# Patient Record
Sex: FEMALE | Employment: FULL TIME | URBAN - METROPOLITAN AREA
[De-identification: names, ages, dates, MRNs, and addresses within clinical notes are randomized per-mention and may not be internally consistent; named-entity substitution may affect disease eponyms.]

---

## 2020-10-06 ENCOUNTER — PRE VISIT (OUTPATIENT)
Dept: NEUROLOGY | Facility: CLINIC | Age: 60
End: 2020-10-06
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
## 2020-10-14 ENCOUNTER — VIRTUAL VISIT (OUTPATIENT)
Dept: NEUROLOGY | Facility: CLINIC | Age: 60
End: 2020-10-14
Payer: COMMERCIAL

## 2020-10-14 DIAGNOSIS — R42 MAL DE DEBARQUEMENT: ICD-10-CM

## 2020-10-14 DIAGNOSIS — H81.10 BENIGN PAROXYSMAL POSITIONAL VERTIGO, UNSPECIFIED LATERALITY: Primary | ICD-10-CM

## 2020-10-14 PROCEDURE — 99205 OFFICE O/P NEW HI 60 MIN: CPT | Mod: 95 | Performed by: PSYCHIATRY & NEUROLOGY
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
## 2020-10-15 PROBLEM — R42 MAL DE DEBARQUEMENT: Status: ACTIVE | Noted: 2020-10-15

## 2021-01-04 ENCOUNTER — HEALTH MAINTENANCE LETTER (OUTPATIENT)
Age: 61
End: 2021-01-04

## 2021-01-17 ENCOUNTER — DOCUMENTATION ONLY (OUTPATIENT)
Dept: CARE COORDINATION | Facility: CLINIC | Age: 61
End: 2021-01-17

## 2021-02-10 ENCOUNTER — VIRTUAL VISIT (OUTPATIENT)
Dept: NEUROLOGY | Facility: CLINIC | Age: 61
End: 2021-02-10
Payer: COMMERCIAL

## 2021-02-10 DIAGNOSIS — R42 VERTIGO: ICD-10-CM

## 2021-02-10 DIAGNOSIS — I87.1 COMPRESSION OF VEIN: ICD-10-CM

## 2021-02-10 DIAGNOSIS — R42 MAL DE DEBARQUEMENT: Primary | ICD-10-CM

## 2021-02-10 DIAGNOSIS — G54.0 TOS (THORACIC OUTLET SYNDROME): ICD-10-CM

## 2021-02-10 PROCEDURE — 99215 OFFICE O/P EST HI 40 MIN: CPT | Mod: 95 | Performed by: PSYCHIATRY & NEUROLOGY

## 2021-02-10 PROCEDURE — 99417 PROLNG OP E/M EACH 15 MIN: CPT | Performed by: PSYCHIATRY & NEUROLOGY

## 2021-02-10 RX ORDER — ALBUTEROL SULFATE 90 UG/1
AEROSOL, METERED RESPIRATORY (INHALATION)
COMMUNITY

## 2021-02-10 RX ORDER — ESCITALOPRAM OXALATE 10 MG/1
TABLET ORAL
COMMUNITY
Start: 2020-09-10

## 2021-02-10 RX ORDER — UBROGEPANT 100 MG/1
TABLET ORAL
COMMUNITY
Start: 2021-01-15

## 2021-02-10 RX ORDER — TOPIRAMATE 25 MG/1
3 CAPSULE, EXTENDED RELEASE ORAL EVERY 24 HOURS
COMMUNITY
Start: 2020-04-12

## 2021-02-10 RX ORDER — DICLOFENAC POTASSIUM 50 MG/1
POWDER, FOR SOLUTION ORAL
COMMUNITY

## 2021-02-10 RX ORDER — ESZOPICLONE 3 MG/1
TABLET, FILM COATED ORAL
COMMUNITY
Start: 2020-08-12

## 2021-02-10 NOTE — PROGRESS NOTES
The patient is being evaluated via a billable video visit.    How would you like to obtain your AVS? MyChart  If the video visit is dropped, the invitation should be resent by: Send to e-mail at: [email or web address]  Will anyone else be joining your video visit? No      Video-Visit Details  Type of service:  Video Visit  Video Start Time 6:10PM  Video End Time:7:02PM  Originating Location (pt. Location): Home  Distant Location (provider location):  [facility]   Platform used for Video Visit: [facility]    Follow-up 10-14-20   IMPRESSIONS:  Lily Mitchell is a 59 year old woman with a history of motion sickness, migraine with aura, with persistent mal de debarquement syndrome (MdDS) for 16 months that developed after a cruise (10-).     She was fairly stable until she caught COVID in mid-November. She had developed fever but no respiratory problems. She did not lose her sense of taste or smell.  She had light sensitivity, difficulty reading, and simply seeing. In that context she developed 15 days of non-stop migraine headaches. Her baseline headaches were about twice a year. The MdDS rocking were much much worse during that time. She could not get out of bed with the combination of symptoms. She was nauseated and was vomiting.  She was never hospitalized but she was left with chronic fatigue and an enhanced startle response.     She had gone to [place] around  and New Years to help her daughter who was having a baby.  She was feeling better by then. Then, she went into the hospital with her daughter during her labor and .  She had stopped taking her Lexapro and Lunesta for about 5 days in the ensuing hecticness of the peripartum period.  She was also sleeping very uncomfortably in the hospital on a cot.     In that context, she developed an episode of vertigo in which the world was moving and she was nauseated and vomited. She had tried the Epley maneuver  to no effect. The vertigo episode lasted all day and night long and most of the next morning. This was around January 14th.  She doesn't remember having a headache at the time but is unsure because the vertigo was so severe. She has since restarted the Lexapro and is back on 10mg.     She came back to [place] on January 17th and took a diazepam for the flight home. She had previously increased her topiramate from 25mg to 100mg a day over a 4 month period because she had such bad headaches but she is down to 75mg at night now because there was concern about glaucoma given some vision changes after COVID. She had also developed very vivid dreaming and an enhanced startle response which then increased the rocking. She has returned to taking 3mg of Lunesta but she can sometimes wake up after only 4 hours of sleep.     The rocking symptoms are currently worse at night or when she is laying down in the middle of the day. There is usually head pressure over the forehead and back of the neck. She usually wakes up with a headache in the morning. There is pressure over the eyes and the ears, especially in the left ear. There is sometimes some popping noises in the left ear. There is neck pain in the back base of the neck. She is not having any chest.     She has a lot of left>right numbness and tingling down her arms. The paresthesias are better when she moves around. She usually notices the symptoms when she has her hands up to read. She has noticed this more in the last few months.    She still feels better with the rocking when she is driving.     She is having 3-4 migraines a month now. There are visual symptoms of a rainbow or halo around objects.  She develops severe light sensitivity with the headaches.The headache can be on both sides of the head. The headaches are usually lasting about a day. She has recently tried Ubrelvy, which has worked once and not as well a second time perhaps because it was taken too late for  too severe of a headache. She took some Cambia which helped.       Recommendations:  1. Consider switch to venlafaxine (Effexor) starting at 12.5mg with a slow titration because of switch.  Would dose escalate in 12.5mg increments and stop when symptoms are at least 75% controlled but no higher than 75mg within 3 months.   2. Use diazepam (Valium) or lorazepam (Ativan) before travel in a dose that allows relaxation but not sedation (this is generally about 0.5mg).  Can also use for short term exacerbations.  Emphasized need to have plenty of rest around further travel.   3. Gentle exercises for possible TOS given her arm paresthesias that are worse with the arms elevated.   4. US for TOS and internal jugular vein for possible stenosis if symptoms don't improve with the venlafaxine  5. Check back in 4 weeks    CC:   Dr. Farideh Altamirano  Patient    71-minutes were spent in evaluation, examination, and counseling as well as documentation.   After a review of the patient s situation, this visit was changed from an in-person visit to a video visit to reduce the risk of COVID-19 exposure.

## 2021-02-10 NOTE — LETTER
2/10/2021       RE: Lily Mitchell  [address] [place] [address]     Dear Colleague,    Thank you for referring your patient, Lily Mitchell, to the [facility] NEUROLOGY CLINIC [place] at [facility]. Please see a copy of my visit note below.    The patient is being evaluated via a billable video visit.    How would you like to obtain your AVS? MyChart  If the video visit is dropped, the invitation should be resent by: Send to e-mail at: [email or web address]  Will anyone else be joining your video visit? No      Video-Visit Details  Type of service:  Video Visit  Video Start Time 6:10PM  Video End Time:7:02PM  Originating Location (pt. Location): Home  Distant Location (provider location):  [facility] NEUROLOGY CLINIC [place]   Platform used for Video Visit: [employer]    Follow-up 10-14-20   IMPRESSIONS:  Lily Mitchell is a 59 year old woman with a history of motion sickness, migraine with aura, with persistent mal de debarquement syndrome (MdDS) for 16 months that developed after a cruise (10-).     She was fairly stable until she caught COVID in mid-November. She had developed fever but no respiratory problems. She did not lose her sense of taste or smell.  She had light sensitivity, difficulty reading, and simply seeing. In that context she developed 15 days of non-stop migraine headaches. Her baseline headaches were about twice a year. The MdDS rocking were much much worse during that time. She could not get out of bed with the combination of symptoms. She was nauseated and was vomiting.  She was never hospitalized but she was left with chronic fatigue and an enhanced startle response.     She had gone to [place] around  and New Years to help her daughter who was having a baby.  She was feeling better by then. Then, she went into the hospital with her daughter during her labor and .  She  had stopped taking her Lexapro and Lunesta for about 5 days in the ensuing hecticness of the peripartum period.  She was also sleeping very uncomfortably in the hospital on a cot.     In that context, she developed an episode of vertigo in which the world was moving and she was nauseated and vomited. She had tried the Epley maneuver to no effect. The vertigo episode lasted all day and night long and most of the next morning. This was around January 14th.  She doesn't remember having a headache at the time but is unsure because the vertigo was so severe. She has since restarted the Lexapro and is back on 10mg.     She came back to [place] on January 17th and took a diazepam for the flight home. She had previously increased her topiramate from 25mg to 100mg a day over a 4 month period because she had such bad headaches but she is down to 75mg at night now because there was concern about glaucoma given some vision changes after COVID. She had also developed very vivid dreaming and an enhanced startle response which then increased the rocking. She has returned to taking 3mg of Lunesta but she can sometimes wake up after only 4 hours of sleep.     The rocking symptoms are currently worse at night or when she is laying down in the middle of the day. There is usually head pressure over the forehead and back of the neck. She usually wakes up with a headache in the morning. There is pressure over the eyes and the ears, especially in the left ear. There is sometimes some popping noises in the left ear. There is neck pain in the back base of the neck. She is not having any chest.     She has a lot of left>right numbness and tingling down her arms. The paresthesias are better when she moves around. She usually notices the symptoms when she has her hands up to read. She has noticed this more in the last few months.    She still feels better with the rocking when she is driving.     She is having 3-4 migraines a month now. There  are visual symptoms of a rainbow or halo around objects.  She develops severe light sensitivity with the headaches.The headache can be on both sides of the head. The headaches are usually lasting about a day. She has recently tried Ubrelvy, which has worked once and not as well a second time perhaps because it was taken too late for too severe of a headache. She took some Cambia which helped.       Recommendations:  1. Consider switch to venlafaxine (Effexor) starting at 12.5mg with a slow titration because of switch.  Would dose escalate in 12.5mg increments and stop when symptoms are at least 75% controlled but no higher than 75mg within 3 months.   2. Use diazepam (Valium) or lorazepam (Ativan) before travel in a dose that allows relaxation but not sedation (this is generally about 0.5mg).  Can also use for short term exacerbations.  Emphasized need to have plenty of rest around further travel.   3. Gentle exercises for possible TOS given her arm paresthesias that are worse with the arms elevated.   4. US for TOS and internal jugular vein for possible stenosis if symptoms don't improve with the venlafaxine  5. Check back in 4 weeks    CC:   Dr. Farideh Altamirano  Patient    71-minutes were spent in evaluation, examination, and counseling as well as documentation.   After a review of the patient s situation, this visit was changed from an in-person visit to a video visit to reduce the risk of COVID-19 exposure.          Again, thank you for allowing me to participate in the care of your patient.      Sincerely,    Heidy MOON Cha, MD

## 2021-02-10 NOTE — LETTER
2/10/2021       RE: Lily Mitchell  [address] [place] [address]     Dear Colleague,    Thank you for referring your patient, Lily Mitchell, to the [facility] NEUROLOGY CLINIC [place] at [facility]. Please see a copy of my visit note below.    The patient is being evaluated via a billable video visit.    How would you like to obtain your AVS? MyChart  If the video visit is dropped, the invitation should be resent by: Send to e-mail at: [email or web address]  Will anyone else be joining your video visit? No  {If patient encounters technical issues they should call [phone] :[phone]}    Video-Visit Details  Type of service:  Video Visit  Video Start Time 6:10PM  Video End Time:7:02PM  Originating Location (pt. Location): Home  Distant Location (provider location):  [facility] NEUROLOGY CLINIC [place]   Platform used for Video Visit: [employer]    Follow-up 10-14-20   IMPRESSIONS:  Lily Mitchell is a 59 year old woman with a history of motion sickness, migraine with aura, with persistent mal de debarquement syndrome (MdDS) for 16 months that developed after a cruise (10-).     She was fairly stable until she caught COVID in mid-November. She had developed fever but no respiratory problems. She did not lose her sense of taste or smell.  She had light sensitivity, difficulty reading, and simply seeing. In that context she developed 15 days of non-stop migraine headaches. Her baseline headaches were about twice a year. The MdDS rocking were much much worse during that time. She could not get out of bed with the combination of symptoms. She was nauseated and was vomiting.  She was never hospitalized but she was left with chronic fatigue and an enhanced startle response.     She had gone to [place] around Christmas and New Years to help her daughter who was having a baby.  She was feeling better by then. Then, she  went into the hospital with her daughter during her labor and .  She had stopped taking her Lexapro and Lunesta for about 5 days in the ensuing hecticness of the peripartum period.  She was also sleeping very uncomfortably in the hospital on a cot.     In that context, she developed an episode of vertigo in which the world was moving and she was nauseated and vomited. She had tried the Epley maneuver to no effect. The vertigo episode lasted all day and night long and most of the next morning. This was around .  She doesn't remember having a headache at the time but is unsure because the vertigo was so severe. She has since restarted the Lexapro and is back on 10mg.     She came back to [place] on  and took a diazepam for the flight home. She had previously increased her topiramate from 25mg to 100mg a day over a 4 month period because she had such bad headaches but she is down to 75mg at night now because there was concern about glaucoma given some vision changes after COVID. She had also developed very vivid dreaming and an enhanced startle response which then increased the rocking. She has returned to taking 3mg of Lunesta but she can sometimes wake up after only 4 hours of sleep.     The rocking symptoms are currently worse at night or when she is laying down in the middle of the day. There is usually head pressure over the forehead and back of the neck. She usually wakes up with a headache in the morning. There is pressure over the eyes and the ears, especially in the left ear. There is sometimes some popping noises in the left ear. There is neck pain in the back base of the neck. She is not having any chest.     She has a lot of left>right numbness and tingling down her arms. The paresthesias are better when she moves around. She usually notices the symptoms when she has her hands up to read. She has noticed this more in the last few months.    She still feels better with the  rocking when she is driving.     She is having 3-4 migraines a month now. There are visual symptoms of a rainbow or halo around objects.  She develops severe light sensitivity with the headaches.The headache can be on both sides of the head. The headaches are usually lasting about a day. She has recently tried Ubrelvy, which has worked once and not as well a second time perhaps because it was taken too late for too severe of a headache. She took some Cambia which helped.       Recommendations:  1. Consider switch to venlafaxine (Effexor) starting at 12.5mg with a slow titration because of switch.  Would dose escalate in 12.5mg increments and stop when symptoms are at least 75% controlled but no higher than 75mg within 3 months.   2. Use diazepam (Valium) or lorazepam (Ativan) before travel in a dose that allows relaxation but not sedation (this is generally about 0.5mg).  Can also use for short term exacerbations.  Emphasized need to have plenty of rest around further travel.   3. Gentle exercises for possible TOS given her arm paresthesias that are worse with the arms elevated.   4. US for TOS and internal jugular vein for possible stenosis if symptoms don't improve with the venlafaxine  5. Check back in 4 weeks    Dr. Farideh Altamirano    71-minutes were spent in evaluation, examination, and counseling as well as documentation.   After a review of the patient s situation, this visit was changed from an in-person visit to a video visit to reduce the risk of COVID-19 exposure.        Selin is a 60 year old who is being evaluated via a billable video visit.      How would you like to obtain your AVS? [employer] AND BY MAIL  If the video visit is dropped, the invitation should be resent by: Text to cell phone: [phone]  Will anyone else be joining your video visit? No  {If patient encounters technical issues they should call [phone] :[phone]}    Video Start Time: {video visit start/end time for  provider to select:[id number]}  Video-Visit Details    Type of service:  Video Visit    Video End Time:{video visit start/end time for provider to select:[id number]}    Originating Location (pt. Location): Home    Distant Location (provider location):  [facility] NEUROLOGY [facility]     Platform used for Video Visit: [employer]        Again, thank you for allowing me to participate in the care of your patient.      Sincerely,    Heidy MOON Cha, MD

## 2021-02-10 NOTE — Clinical Note
2/10/2021       RE: Lily Mitchell  [address] [place] [address]     Dear Colleague,    Thank you for referring your patient, Lily Mitchell, to the [facility] NEUROLOGY CLINIC [place] at [facility]. Please see a copy of my visit note below.    The patient is being evaluated via a billable video visit.    How would you like to obtain your AVS? MyChart  If the video visit is dropped, the invitation should be resent by: Send to e-mail at: [email or web address]  Will anyone else be joining your video visit? No  {If patient encounters technical issues they should call [phone] :[phone]}    Video-Visit Details  Type of service:  Video Visit  Video Start Time 6:10PM  Video End Time:7:02PM  Originating Location (pt. Location): Home  Distant Location (provider location):  [facility] NEUROLOGY CLINIC [place]   Platform used for Video Visit: [employer]    Follow-up 10-14-20   IMPRESSIONS:  Lily Mitchell is a 59 year old woman with a history of motion sickness, migraine with aura, with persistent mal de debarquement syndrome (MdDS) for 1 year that developed after a cruise (10-).  Symptoms of persistent swaying that occurs after entrainment to passive motion and that reduce with re-exposure to passive motion such as driving meet diagnostic criteria for MdDS.  The most effective medications for MdDS are antidepressants that work through the serotonin system as well as benzodiazepines.  Common associated symptoms include fatigue, 'brain fog,' visual motion intolerance, sleep disturbance, headache, and anxiety. About a third of patients with MdDS feel more motion when they are lying down. The syndrome can be depressing though patients usually don't meet DSM criteria for depression.  The chance of a spontaneous remission does decline after about 6-months but symptoms can be effectively managed with lifestyle modification and  medications.     Interim history:  She was doing okay until she caught COVID in mid-November. She had developed fever but no respiratory problems. She did not lose her sense of taste or smell.  She had light sensitivity. She had difficulty reading and simply seeing. She developed 15 days of non-stop migraine headaches and the MdDS rocking were just much worse during that time. She could not get out of bed with symptoms. She was nauseated and vomiting.  She was never hospitalized but she was left with chronic fatigue and an enhanced startle response. She is still very fatigued and starts to get severely tired around 3-4pm.     She had gone to [place] around  and New Years, she was feeling better. She didn't like the sexual side effects of Lexapro. Then, she went into the hospital with her daughter during her labor and .  She had stopped taking her Lexapro and Lunesta for about 5 days. Then, she developed an episode of vertigo in which the world was moving and she was nauseated and vomited.  She had tried the Epley maneuver to no effect. The vertigo episode lasted all day and night long and most of the next morning. This was around .  She doesn't remember having a headache at the time. She has since restarted the Lexapro and is back on 10mg. She came back on  and took a diazepam for the flight home. She had increased topiramate from 25mg to 100mg a day over 4 months because she had such bad headaches but she is down to 75mg at night.  There was concern about glaucoma given some vision changes after COVID so this was stopped. There was also very vivid dreaming and an enhanced startle response which then increase the rocking. She has returned to taking 3mg of Lunesta but she can sometimes she can wake up after 4 hours.     The rocking symptoms are worse at night or when she is laying down in the middle of the day. There is usually head pressure over the forehead and back of  the neck. She is usually waking up with a headache in the morning. There is pressure over the eyes and the ears, especially in the left ear. There is sometimes some popping noises in the left ear. There is neck pain back of the neck at the base of the neck. She is not having any chest. She has a lot of left>right numbness and tingling down her arms. The paresthesias are better when she moves around. She usually notices the symptoms when she has her hands up to read. She has noticed this more in the last few months.    She still feels better with the rocking when she is driving.   She is having 3-4 migraines a month now. There is visual symptoms of a rainbow or halo around objects.  She develops severe light sensitivity.The headache can be on both sides of the head. The headaches are usually lasting about a day. She has recently tried Ubrelvy, which has worked once and not as well a second time perhaps because it was taken too late for too severe of a headache. She took some Cambia which helped.       Recommendations:  1. Consider switch to venlafaxine (Effexor) starting at 12.5mg with a slow titration because of switch.  Would dose escalate in 12.5mg increments and stop when symptoms are at least 75% controlled but no higher than 75mg within 3 months.   2. Use diazepam (Valium) or lorazepam (Ativan) before travel in a dose that allows relaxation but not sedation (this is generally about 0.5mg).  Can also use for short term exacerbations.  Emphasized need to have plenty of rest around further travel.   3. Gentle exercises for TOS  4. US for TOS and internal jugular vein for possible stenosis if symptoms don't improve  5. Check back in 4 weeks    Dr. Farideh Altamirano    60-minutes were spent in evaluation, examination, and counseling as well as documentation.   After a review of the patient s situation, this visit was changed from an in-person visit to a video visit to reduce the risk of COVID-19  exposure.        Selin is a 60 year old who is being evaluated via a billable video visit.      How would you like to obtain your AVS? MyChart AND BY MAIL  If the video visit is dropped, the invitation should be resent by: Text to cell phone: [phone]  Will anyone else be joining your video visit? No  {If patient encounters technical issues they should call [phone] :[phone]}    Video Start Time: {video visit start/end time for provider to select:[id number]}  Video-Visit Details    Type of service:  Video Visit    Video End Time:{video visit start/end time for provider to select:[id number]}    Originating Location (pt. Location): Home    Distant Location (provider location):  [facility]     Platform used for Video Visit: [employer]      The patient is being evaluated via a billable video visit.    How would you like to obtain your AVS? MyChart  If the video visit is dropped, the invitation should be resent by: Send to e-mail at: [email or web address]  Will anyone else be joining your video visit? No  {If patient encounters technical issues they should call [phone] :[phone]}    Video-Visit Details  Type of service:  Video Visit  Video Start Time 6:10PM  Video End Time:7:02PM  Originating Location (pt. Location): Home  Distant Location (provider location):  [facility]   Platform used for Video Visit: [facility]    Follow-up 10-14-20   IMPRESSIONS:  Lily Mitchell is a 59 year old woman with a history of motion sickness, migraine with aura, with persistent mal de debarquement syndrome (MdDS) for 16 months that developed after a cruise (10-).     She was fairly stable until she caught COVID in mid-November. She had developed fever but no respiratory problems. She did not lose her sense of taste or smell.  She had light sensitivity, difficulty reading, and simply seeing. In that context she developed 15 days of non-stop migraine headaches. Her  baseline headaches were about twice a year. The MdDS rocking were much much worse during that time. She could not get out of bed with the combination of symptoms. She was nauseated and was vomiting.  She was never hospitalized but she was left with chronic fatigue and an enhanced startle response.     She had gone to [place] around  and New Years to help her daughter who was having a baby.  She was feeling better by then. Then, she went into the hospital with her daughter during her labor and .  She had stopped taking her Lexapro and Lunesta for about 5 days in the ensuing hecticness of the peripartum period.  She was also sleeping very uncomfortably in the hospital on a cot.     In that context, she developed an episode of vertigo in which the world was moving and she was nauseated and vomited. She had tried the Epley maneuver to no effect. The vertigo episode lasted all day and night long and most of the next morning. This was around .  She doesn't remember having a headache at the time but is unsure because the vertigo was so severe. She has since restarted the Lexapro and is back on 10mg.     She came back to [place] on  and took a diazepam for the flight home. She had previously increased her topiramate from 25mg to 100mg a day over a 4 month period because she had such bad headaches but she is down to 75mg at night now because there was concern about glaucoma given some vision changes after COVID. She had also developed very vivid dreaming and an enhanced startle response which then increased the rocking. She has returned to taking 3mg of Lunesta but she can sometimes wake up after only 4 hours of sleep.     The rocking symptoms are currently worse at night or when she is laying down in the middle of the day. There is usually head pressure over the forehead and back of the neck. She usually wakes up with a headache in the morning. There is pressure over the eyes and  the ears, especially in the left ear. There is sometimes some popping noises in the left ear. There is neck pain in the back base of the neck. She is not having any chest.     She has a lot of left>right numbness and tingling down her arms. The paresthesias are better when she moves around. She usually notices the symptoms when she has her hands up to read. She has noticed this more in the last few months.    She still feels better with the rocking when she is driving.     She is having 3-4 migraines a month now. There are visual symptoms of a rainbow or halo around objects.  She develops severe light sensitivity with the headaches.The headache can be on both sides of the head. The headaches are usually lasting about a day. She has recently tried Ubrelvy, which has worked once and not as well a second time perhaps because it was taken too late for too severe of a headache. She took some Cambia which helped.       Recommendations:  1. Consider switch to venlafaxine (Effexor) starting at 12.5mg with a slow titration because of switch.  Would dose escalate in 12.5mg increments and stop when symptoms are at least 75% controlled but no higher than 75mg within 3 months.   2. Use diazepam (Valium) or lorazepam (Ativan) before travel in a dose that allows relaxation but not sedation (this is generally about 0.5mg).  Can also use for short term exacerbations.  Emphasized need to have plenty of rest around further travel.   3. Gentle exercises for possible TOS given her arm paresthesias that are worse with the arms elevated.   4. US for TOS and internal jugular vein for possible stenosis if symptoms don't improve with the venlafaxine  5. Check back in 4 weeks    Dr. Farideh Altamirano    71-minutes were spent in evaluation, examination, and counseling as well as documentation.   After a review of the patient s situation, this visit was changed from an in-person visit to a video visit to reduce the risk of COVID-19  exposure.          Again, thank you for allowing me to participate in the care of your patient.      Sincerely,    Heidy MOON Cha, MD

## 2021-02-10 NOTE — LETTER
February 10, 2021       TO: Lily Mitchell  [address] [facility] [address]       DearMs.Stephen,    We are writing to inform you of your test results.    {p results letter list:020812}    No results found from the In Basket message.    ***

## 2021-02-10 NOTE — PROGRESS NOTES
Selin is a 60 year old who is being evaluated via a billable video visit.      How would you like to obtain your AVS? MyChart AND BY MAIL  If the video visit is dropped, the invitation should be resent by: Text to cell phone: [phone]  Will anyone else be joining your video visit? No  {If patient encounters technical issues they should call [phone] :[phone]}    Video Start Time: {video visit start/end time for provider to select:435354}  Video-Visit Details    Type of service:  Video Visit    Video End Time:{video visit start/end time for provider to select:554072}    Originating Location (pt. Location): Home    Distant Location (provider location):  [facility] NEUROLOGY CLINIC [place]     Platform used for Video Visit: [employer]

## 2021-03-21 DIAGNOSIS — R42 MAL DE DEBARQUEMENT: Primary | ICD-10-CM

## 2021-03-21 RX ORDER — VENLAFAXINE HYDROCHLORIDE 37.5 MG/1
37.5 TABLET, EXTENDED RELEASE ORAL DAILY
Qty: 30 TABLET | Refills: 3 | Status: SHIPPED | OUTPATIENT
Start: 2021-03-21

## 2021-03-22 ENCOUNTER — TELEPHONE (OUTPATIENT)
Dept: NEUROLOGY | Facility: CLINIC | Age: 61
End: 2021-03-22

## 2021-03-22 NOTE — TELEPHONE ENCOUNTER
Central Prior Authorization Team   Phone: [phone]      PRIOR AUTHORIZATION DENIED    Medication: venlafaxine (EFFEXOR-ER) 37.5 MG 24 hr tablet - DENIED    Denial Date: 3/22/2021    Denial Rational:       Appeal Information:

## 2021-03-24 ENCOUNTER — VIRTUAL VISIT (OUTPATIENT)
Dept: NEUROLOGY | Facility: CLINIC | Age: 61
End: 2021-03-24
Payer: COMMERCIAL

## 2021-03-24 DIAGNOSIS — I87.1 COMPRESSION OF VEIN: Primary | ICD-10-CM

## 2021-03-24 DIAGNOSIS — G54.0 TOS (THORACIC OUTLET SYNDROME): ICD-10-CM

## 2021-03-24 DIAGNOSIS — R42 MAL DE DEBARQUEMENT: ICD-10-CM

## 2021-03-24 PROCEDURE — 99215 OFFICE O/P EST HI 40 MIN: CPT | Mod: 95 | Performed by: PSYCHIATRY & NEUROLOGY

## 2021-03-24 ASSESSMENT — PATIENT HEALTH QUESTIONNAIRE - PHQ9: SUM OF ALL RESPONSES TO PHQ QUESTIONS 1-9: 18

## 2021-03-24 NOTE — PROGRESS NOTES
The patient is being evaluated via a billable video visit.    How would you like to obtain your AVS? MyChart  If the video visit is dropped, the invitation should be resent by: Send to e-mail at: [email or web address]  Will anyone else be joining your video visit? No      Video-Visit Details  Type of service:  Video Visit  Video Start Time: 6:30PM  Video End Time: 7:20Pm  Originating Location (pt. Location): [facility]  Distant Location (provider location):  [facility] NEUROLOGY [facility]INIC [place]   Platform used for Video Visit: [facility]     Follow-up 2-10-21  Lily Mitchell is a 59 year old woman with a history of motion sickness, migraine with aura, with persistent mal de debarquement syndrome (MdDS) for 16 months that developed after a cruise (10-).      The rocking symptoms are currently worse at night or when she is laying down in the middle of the day. There is usually head pressure over the forehead and back of the neck. She usually wakes up with a headache in the morning. There is pressure over the eyes and the ears, especially in the left ear. There is sometimes some popping noises in the left ear. There is neck pain in the back base of the neck. She is not having any chest.      She has a lot of left>right numbness and tingling down her arms. The paresthesias are better when she moves around. She usually notices the symptoms when she has her hands up to read. She has noticed this more in the last few months.    3-24-21 Interim History  She has not been feeling well for the last several weeks doing the taper off of the Lexapro and starting Effexor, but had been only been able to get up to 12.5mg of the Effexor.  She's been on this dose for 5 weeks. She has been more depressed and more anxious during this switch. There are no thoughts of harming herself. She had felt prettydown a couple times in the last few weeks. There were a couple days when she did not want to get out of bed.     She's been having  "headaches more frequently. The headaches are more of a vague pain over the the front and side of the head. She had 3 actual migraines in the last month. She treated them with Cambia.  They responded well.  She has paid more attention to the frequency of the rocking and it seems to change with the heart rate, e.g faster with more activity.     She still has tingling down the arms, left worse than right.     Exam: Alert, oriented, able to provide a clear history. We spent most the time reviewing her recent events and talking about a plan for how to understand her symptoms better. We did a test to see how changes in her head position affected the intensity of the rocking. If her baseline rocking were set at \"5\" on a scale of 0-10, the impact of different head positions was as follows:     Neutral set at 5/10  Head flexion: 6/10  Head extension: 7/10  Head to right: 4/10  Head to left: 5/10  Chin tuck: 8/10  Left arm raise: 6/10  Right arm raise: 4/10    Impression: 60F with persistent mal de debarquement syndrome syndrome with intensity of rocking that is modulated by head position.  She has been feeling worse with a switch to Effexor off of Lexapro but she is on a very low-dose of Effexor and there are some question as to whether there may be difference in symptoms throughout the day because of the short acting nature of the Effexor that she is currently on.  In order to resolve this issue, we will have her switch over to the long-acting Effexor but only take half of what is in a 37.5 mg capsule to start.  If this goes well then she can increase to a full capsule after a couple of weeks.  I am concerned about the head position modulation of the feeling of rocking.  We should work this up further with vascular imaging in the neck.  If there is a compression, focus physical therapy may be an option.  We talked about plans for having the imaging done in [place].    Plan:   1. Effexor 37.5mg ER capsule, 1/2 capsule to " start.  2. US internal jugular/TOS  3. CT Venogram following US and had positions that exacerbate her rocking  4. Follow-up after imaging    DATA:  All available and relevant labs, imaging, and other procedures were reviewed personally.     Last Labs:  CMPNo results for input(s): NA, POTASSIUM, CHLORIDE, CO2, ANIONGAP, GLC, BUN, CR, GFRESTIMATED, GFRESTBLACK, MADELYN, MAG, PHOS, PROTTOTAL, ALBUMIN, BILITOTAL, ALKPHOS, AST, ALT in the last 68987 hours.  CBCNo results for input(s): HGB, WBC, RBC, HCT, MCV, MCH, MCHC, RDW, PLT in the last 86234 hours.  INRNo results for input(s): INR, PTT in the last 66913 hours.    68-minutes were spent in evaluation, examination, and counseling as well as documentation.   After a review of the patient s situation, this visit was changed from an in-person visit to a video visit to reduce the risk of COVID-19 exposure.

## 2021-03-24 NOTE — LETTER
3/24/2021       RE: Lily Mitchell  [address] [place] [address]     Dear Colleague,    Thank you for referring your patient, Lily Mitchell, to the [facility] NEUROLOGY CL[facility]IC [place] [facility]. Please see a copy of my visit note below.    The patient is being evaluated via a billable video visit.    How would you like to obtain your AVS? MyChart  If the video visit is dropped, the invitation should be resent by: Send to e-mail at: [email or web address]  Will anyone else be joining your video visit? No      Video-Visit Details  Type of service:  Video Visit  Video Start Time: 6:30PM  Video End Time: 7:20Pm  Originating Location (pt. Location): [facility]  Distant Location (provider location):  [facility] NEUROLOGY [facility]   Platform used for Video Visit: [employer]     Follow-up 2-10-21  Lily Mitchell is a 59 year old woman with a history of motion sickness, migraine with aura, with persistent mal de debarquement syndrome (MdDS) for 16 months that developed after a cruise (10-).      The rocking symptoms are currently worse at night or when she is laying down in the middle of the day. There is usually head pressure over the forehead and back of the neck. She usually wakes up with a headache in the morning. There is pressure over the eyes and the ears, especially in the left ear. There is sometimes some popping noises in the left ear. There is neck pain in the back base of the neck. She is not having any chest.      She has a lot of left>right numbness and tingling down her arms. The paresthesias are better when she moves around. She usually notices the symptoms when she has her hands up to read. She has noticed this more in the last few months.    3-24-21 Interim History  She has not been feeling well for the last several weeks doing the taper off of the Lexapro and starting Effexor, but had been only been  "able to get up to 12.5mg of the Effexor.  She's been on this dose for 5 weeks. She has been more depressed and more anxious during this switch. There are no thoughts of harming herself. She had felt prettydown a couple times in the last few weeks. There were a couple days when she did not want to get out of bed.     She's been having headaches more frequently. The headaches are more of a vague pain over the the front and side of the head. She had 3 actual migraines in the last month. She treated them with Cambia.  They responded well.  She has paid more attention to the frequency of the rocking and it seems to change with the heart rate, e.g faster with more activity.     She still has tingling down the arms, left worse than right.     Exam: Alert, oriented, able to provide a clear history. We spent most the time reviewing her recent events and talking about a plan for how to understand her symptoms better. We did a test to see how changes in her head position affected the intensity of the rocking. If her baseline rocking were set at \"5\" on a scale of 0-10, the impact of different head positions was as follows:     Neutral set at 5/10  Head flexion: 6/10  Head extension: 7/10  Head to right: 4/10  Head to left: 5/10  Chin tuck: 8/10  Left arm raise: 6/10  Right arm raise: 4/10    Impression: 60F with persistent mal de debarquement syndrome syndrome with intensity of rocking that is modulated by head position.  She has been feeling worse with a switch to Effexor off of Lexapro but she is on a very low-dose of Effexor and there are some question as to whether there may be difference in symptoms throughout the day because of the short acting nature of the Effexor that she is currently on.  In order to resolve this issue, we will have her switch over to the long-acting Effexor but only take half of what is in a 37.5 mg capsule to start.  If this goes well then she can increase to a full capsule after a couple of weeks.  " I am concerned about the head position modulation of the feeling of rocking.  We should work this up further with vascular imaging in the neck.  If there is a compression, focus physical therapy may be an option.  We talked about plans for having the imaging done in [place].    Plan:   1. Effexor 37.5mg ER capsule, 1/2 capsule to start.  2. US internal jugular/TOS  3. CT Venogram following US and had positions that exacerbate her rocking  4. Follow-up after imaging    DATA:  All available and relevant labs, imaging, and other procedures were reviewed personally.     Last Labs:  CMPNo results for input(s): NA, POTASSIUM, CHLORIDE, CO2, ANIONGAP, GLC, BUN, CR, GFRESTIMATED, GFRESTBLACK, MADELYN, MAG, PHOS, PROTTOTAL, ALBUMIN, BILITOTAL, ALKPHOS, AST, ALT in the last 71472 hours.  CBCNo results for input(s): HGB, WBC, RBC, HCT, MCV, MCH, MCHC, RDW, PLT in the last 05428 hours.  INRNo results for input(s): INR, PTT in the last 38826 hours.    68-minutes were spent in evaluation, examination, and counseling as well as documentation.   After a review of the patient s situation, this visit was changed from an in-person visit to a video visit to reduce the risk of COVID-19 exposure.      Again, thank you for allowing me to participate in the care of your patient.      Sincerely,    Heidy MOON Cha, MD

## 2021-03-24 NOTE — PROGRESS NOTES
Selin is a 60 year old who is being evaluated via a billable video visit.      How would you like to obtain your AVS? Mail a copy and Mychart  If the video visit is dropped, the invitation should be resent by: [phone]  Will anyone else be joining your video visit? No  {If patient encounters technical issues they should call [phone] :[phone]}    Video Start Time: {video visit start/end time for provider to select:706417}  Video-Visit Details    Type of service:  Video Visit    Video End Time:{video visit start/end time for provider to select:963791}    Originating Location (pt. Location): Home    Distant Location (provider location):  [facility] NEUROLOGY [facility]     Platform used for Video Visit: [employer] send link to [phone]    Depression Response    Patient completed the PHQ-9 assessment for depression and scored >9? Yes  Question 9 on the PHQ-9 was positive for suicidality? Yes  Does patient have current mental health provider? No    Is this a virtual visit? Yes   Does patient have suicidal ideation (positive question 9)? Yes    I personally notified the following: Red flag triage closed for the evening. Notified provider.      Chief Complaint   Patient presents with     RECHECK     VIDEO VISIT RETURN      Heri Francisco

## 2021-03-24 NOTE — Clinical Note
3/24/2021       RE: Lily Mitchell  [address] [place] [facility] [address]     Dear Colleague,    Thank you for referring your patient, Lily Mitchell, to the [facility] NEUROLOGY CLINIC [facility]. Please see a copy of my visit note below.    No notes on file    Again, thank you for allowing me to participate in the care of your patient.      Sincerely,    Heidy MOON Cha, MD

## 2021-04-30 ENCOUNTER — ANCILLARY PROCEDURE (OUTPATIENT)
Dept: ULTRASOUND IMAGING | Facility: CLINIC | Age: 61
End: 2021-04-30
Attending: PSYCHIATRY & NEUROLOGY
Payer: COMMERCIAL

## 2021-04-30 ENCOUNTER — ANCILLARY PROCEDURE (OUTPATIENT)
Dept: CT IMAGING | Facility: CLINIC | Age: 61
End: 2021-04-30
Attending: PSYCHIATRY & NEUROLOGY
Payer: COMMERCIAL

## 2021-04-30 DIAGNOSIS — I87.1 COMPRESSION OF VEIN: ICD-10-CM

## 2021-04-30 DIAGNOSIS — G54.0 TOS (THORACIC OUTLET SYNDROME): ICD-10-CM

## 2021-04-30 PROCEDURE — 93930 UPPER EXTREMITY STUDY: CPT | Mod: GC | Performed by: RADIOLOGY

## 2021-04-30 PROCEDURE — 70496 CT ANGIOGRAPHY HEAD: CPT | Mod: GC | Performed by: RADIOLOGY

## 2021-04-30 PROCEDURE — 93970 EXTREMITY STUDY: CPT | Mod: GC | Performed by: RADIOLOGY

## 2021-04-30 PROCEDURE — 70498 CT ANGIOGRAPHY NECK: CPT | Mod: GC | Performed by: RADIOLOGY

## 2021-04-30 RX ORDER — IOPAMIDOL 755 MG/ML
75 INJECTION, SOLUTION INTRAVASCULAR ONCE
Status: COMPLETED | OUTPATIENT
Start: 2021-04-30 | End: 2021-04-30

## 2021-04-30 RX ADMIN — IOPAMIDOL 75 ML: 755 INJECTION, SOLUTION INTRAVASCULAR at 14:53

## 2021-10-11 ENCOUNTER — HEALTH MAINTENANCE LETTER (OUTPATIENT)
Age: 61
End: 2021-10-11

## 2022-01-30 ENCOUNTER — HEALTH MAINTENANCE LETTER (OUTPATIENT)
Age: 62
End: 2022-01-30

## 2022-09-24 ENCOUNTER — HEALTH MAINTENANCE LETTER (OUTPATIENT)
Age: 62
End: 2022-09-24

## 2023-01-29 ENCOUNTER — HEALTH MAINTENANCE LETTER (OUTPATIENT)
Age: 63
End: 2023-01-29

## 2023-05-08 ENCOUNTER — HEALTH MAINTENANCE LETTER (OUTPATIENT)
Age: 63
End: 2023-05-08